# Patient Record
Sex: FEMALE | Race: BLACK OR AFRICAN AMERICAN | NOT HISPANIC OR LATINO | URBAN - NONMETROPOLITAN AREA
[De-identification: names, ages, dates, MRNs, and addresses within clinical notes are randomized per-mention and may not be internally consistent; named-entity substitution may affect disease eponyms.]

---

## 2017-11-03 ENCOUNTER — OFFICE VISIT (OUTPATIENT)
Dept: FAMILY MEDICINE CLINIC | Facility: CLINIC | Age: 22
End: 2017-11-03

## 2017-11-03 VITALS
RESPIRATION RATE: 20 BRPM | HEART RATE: 86 BPM | TEMPERATURE: 98 F | DIASTOLIC BLOOD PRESSURE: 112 MMHG | OXYGEN SATURATION: 98 % | SYSTOLIC BLOOD PRESSURE: 150 MMHG | BODY MASS INDEX: 40.19 KG/M2 | WEIGHT: 256.1 LBS | HEIGHT: 67 IN

## 2017-11-03 DIAGNOSIS — Z76.89 ENCOUNTER TO ESTABLISH CARE: Primary | ICD-10-CM

## 2017-11-03 DIAGNOSIS — G47.00 INSOMNIA, UNSPECIFIED TYPE: ICD-10-CM

## 2017-11-03 DIAGNOSIS — E66.01 MORBID OBESITY (HCC): ICD-10-CM

## 2017-11-03 PROBLEM — I10 HIGH BLOOD PRESSURE: Status: ACTIVE | Noted: 2017-11-03

## 2017-11-03 PROCEDURE — 99213 OFFICE O/P EST LOW 20 MIN: CPT | Performed by: NURSE PRACTITIONER

## 2017-11-03 NOTE — PROGRESS NOTES
Subjective   Cameron Cristobal is a 22 y.o. female.   Here today to establish primary care.  Has been having  difficulty falling and styling asleep at night for the past two months.  Believes it may be stress related.  Says its gets worse when she is stressed out but sleeps great when there is no stress.  Wants to discuss weight loss options. Is attempting to sign up for the [place] service [employer] but is required to loose weight first.  Has lost almost thirty pounds in the last 3 months.  Does not eat on a regular schedule and drinks 3-4 gallons of milk per week.     Obesity   This is a chronic problem. The current episode started more than 1 year ago. The problem occurs constantly. The problem has been gradually improving. Associated symptoms include fatigue. The symptoms are aggravated by eating and stress. Treatments tried: Tried dietary changes and exercises. The treatment provided moderate relief.   Insomnia   This is a new problem. The current episode started more than 1 month ago. The problem occurs intermittently. The problem has been unchanged. Associated symptoms include fatigue. The symptoms are aggravated by stress. Treatments tried: Has tried Bendaryl  The treatment provided no relief.        The following portions of the patient's history were reviewed and updated as appropriate: allergies, current medications, past family history, past medical history, past social history, past surgical history and problem list.    Review of Systems   Constitutional: Positive for fatigue.   HENT: Negative.    Eyes: Negative.    Respiratory: Negative.    Cardiovascular: Negative.    Gastrointestinal: Negative.    Genitourinary: Negative.    Musculoskeletal: Negative.    Skin: Negative.    Psychiatric/Behavioral: The patient has insomnia.        Objective   Physical Exam   Constitutional: She is oriented to person, place, and time. She appears well-developed and well-nourished.   HENT:   Head:  Normocephalic.   Right Ear: External ear normal.   Eyes: EOM are normal. Pupils are equal, round, and reactive to light.   Neck: Normal range of motion. Neck supple.   Cardiovascular: Normal rate, regular rhythm and normal heart sounds.    Pulmonary/Chest: Effort normal and breath sounds normal.   Abdominal: Soft. Bowel sounds are normal.   Musculoskeletal: Normal range of motion.   Neurological: She is alert and oriented to person, place, and time.   Skin: Skin is warm and dry.   Psychiatric: She has a normal mood and affect. Her behavior is normal. Judgment and thought content normal.   Nursing note and vitals reviewed.      Assessment/Plan   Problems Addressed this Visit     None      Visit Diagnoses     Encounter to establish care    -  Primary    Morbid obesity        Insomnia, unspecified type              Spent 15 minutes out of thirty face-to-face discussing weight los options including reducing caloric intake, increasing exercise routine and making healthier choices  Put on [id number] calorie diet with 150 minutes of exercise per week minimum  Reduce milk intake to 8 ounces per day   Offered Contrave but patient declined at this time  Set weight loss goal of 10 pounds in 1 month  Keep food journal and bring to next appointment   Begin Melatonin 6 mg nightly for insomnia   Check blood pressure 3 times per week and bring log to next appointment         This document has been electronically signed by LACEY Skinner on November 3, 2017 2:11 PM

## 2017-12-08 ENCOUNTER — OFFICE VISIT (OUTPATIENT)
Dept: FAMILY MEDICINE CLINIC | Facility: CLINIC | Age: 22
End: 2017-12-08

## 2017-12-08 VITALS
OXYGEN SATURATION: 100 % | HEART RATE: 78 BPM | HEIGHT: 67 IN | WEIGHT: 244.1 LBS | DIASTOLIC BLOOD PRESSURE: 98 MMHG | RESPIRATION RATE: 20 BRPM | BODY MASS INDEX: 38.31 KG/M2 | TEMPERATURE: 97.9 F | SYSTOLIC BLOOD PRESSURE: 140 MMHG

## 2017-12-08 DIAGNOSIS — R03.0 ELEVATED BLOOD PRESSURE READING: ICD-10-CM

## 2017-12-08 DIAGNOSIS — IMO0002 CYST OF NECK: ICD-10-CM

## 2017-12-08 DIAGNOSIS — Z12.4 SCREENING FOR CERVICAL CANCER: ICD-10-CM

## 2017-12-08 DIAGNOSIS — E66.09 CLASS 2 OBESITY DUE TO EXCESS CALORIES WITHOUT SERIOUS COMORBIDITY WITH BODY MASS INDEX (BMI) OF 38.0 TO 38.9 IN ADULT: Primary | ICD-10-CM

## 2017-12-08 PROCEDURE — 99213 OFFICE O/P EST LOW 20 MIN: CPT | Performed by: NURSE PRACTITIONER

## 2017-12-08 RX ORDER — HYDROCHLOROTHIAZIDE 25 MG/1
25 TABLET ORAL DAILY
Qty: 90 TABLET | Refills: 3 | Status: SHIPPED | OUTPATIENT
Start: 2017-12-08 | End: 2018-12-17 | Stop reason: SDUPTHER

## 2017-12-08 NOTE — PROGRESS NOTES
Subjective   Cameron Cristobal is a 22 y.o. female.  Here today for 4 week recheck for weight loss.  Patient was put on a diet and exercise routine at last visit and encouraged to lose 10 pounds.  Has lost 12 pounds in the last 4 weeks.  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  Is nervous about coming to the doctor today and thinks that her blood pressure goes up every time she is in the office.  No nausea, headaches or dizziness.  Has not to the left side of her neck that has been there for most of her life.  Does not bother her just wants to have it checked out to make sure it's okay.    Obesity   This is a new problem. The current episode started more than 1 year ago. The problem occurs constantly. The problem has been unchanged. The symptoms are aggravated by eating and drinking. Treatments tried: Reduced caloric intake and exercise program. The treatment provided no relief.   Cyst   This is a chronic problem. The current episode started more than 1 year ago. The problem occurs constantly. The problem has been unchanged. Nothing aggravates the symptoms. She has tried nothing for the symptoms.        The following portions of the patient's history were reviewed and updated as appropriate: allergies, current medications, past family history, past medical history, past social history, past surgical history and problem list.    Review of Systems   Constitutional: Negative.    HENT: Negative.    Respiratory: Negative.    Cardiovascular: Negative.    Gastrointestinal: Negative.    Genitourinary: Negative.    Skin: Negative.        Objective   Physical Exam   Constitutional: She appears well-developed and well-nourished.   HENT:   Head: Normocephalic.   Right Ear: External ear normal.   Left Ear: External ear normal.   Eyes: EOM are normal. Pupils are equal, round, and reactive to light.   Neck: Normal range of motion. Neck supple.       Cardiovascular: Normal rate, regular  rhythm and normal heart sounds.    Nursing note and vitals reviewed.       Assessment/Plan   Problems Addressed this Visit     None      Visit Diagnoses     Class 2 obesity due to excess calories without serious comorbidity with body mass index (BMI) of 38.0 to 38.9 in adult    -  Primary    Relevant Medications    naltrexone-bupropion ER (CONTRAVE) 8-90 MG tablet    Elevated blood pressure reading        Relevant Medications    hydrochlorothiazide (HYDRODIURIL) 25 MG tablet    Screening for cervical cancer        Relevant Orders    Ambulatory Referral to Gynecology    Cyst of neck              Class II obesity due to excess calories:  Begin prescription for contrite as prescribed at her discretion  Continue to reduce daily caloric intake to 2000 roberto per day  Encouraged to continue on exercise program as a minimum of 150 minutes per week  Encouraged healthy eating to include fruits, vegetables and salads  Schedule follow-up appointment in 1 month after beginning contrite for recheck    Elevated blood pressure reading:  Begin prescription for hydrochlorothiazide as prescribed be taken one by mouth daily  Reduce sodium intake to no more than 2400 mg per day  Educated on foods that have a high sodium content  Schedule follow-up appointment in 6 weeks for recheck of blood pressure    Screening for cervical cancer:  [facility] and will call to schedule appointment    Cyst of neck:  Since systems nontender and nonmobile will continue to monitor this office  Encouraged patient to schedule follow-up appointment if ever becomes tender or begins to cause complications        This document has been electronically signed by LACEY Skinner on December 8, 2017 11:01 AM

## 2018-01-05 ENCOUNTER — OFFICE VISIT (OUTPATIENT)
Dept: OBSTETRICS AND GYNECOLOGY | Facility: CLINIC | Age: 23
End: 2018-01-05

## 2018-01-05 VITALS
BODY MASS INDEX: 37.98 KG/M2 | WEIGHT: 242 LBS | SYSTOLIC BLOOD PRESSURE: 140 MMHG | DIASTOLIC BLOOD PRESSURE: 98 MMHG | HEIGHT: 67 IN

## 2018-01-05 DIAGNOSIS — Z01.419 CERVICAL SMEAR, AS PART OF ROUTINE GYNECOLOGICAL EXAMINATION: Primary | ICD-10-CM

## 2018-01-05 PROCEDURE — 88142 CYTOPATH C/V THIN LAYER: CPT | Performed by: OBSTETRICS & GYNECOLOGY

## 2018-01-05 PROCEDURE — 99385 PREV VISIT NEW AGE 18-39: CPT | Performed by: OBSTETRICS & GYNECOLOGY

## 2018-01-05 NOTE — PROGRESS NOTES
Subjective   Chief Complaint   Patient presents with   • Gynecologic Exam     pt states she has never had a pap smear. here today for a pap      Cameron Cristobal is a 22 y.o. year old  presenting to be seen for her annual exam.  Today she has no gynecological complaints.    Patient's last menstrual period was 2017 (exact date).    Current birth control method: none.    Past Medical History:   Diagnosis Date   • Ear infection    • Eczema    • Hypertension      OB History      Para Term  AB Living    0 0 0 0 0 0    SAB TAB Ectopic Multiple Live Births    0 0 0 0 0        Past Surgical History:   Procedure Laterality Date   • MOUTH SURGERY      wisdom teeth     History reviewed. No pertinent family history.    Current Outpatient Prescriptions:   •  hydrochlorothiazide (HYDRODIURIL) 25 MG tablet, Take 1 tablet by mouth Daily., Disp: 90 tablet, Rfl: 3  •  naltrexone-bupropion ER (CONTRAVE) 8-90 MG tablet, Wk 1: 1 tab daily, Wk 2: 1 tab twice a day, Wk 3: 2 tabs in AM, 1 tab in PM, Wk 4: 2 tabs twice a day, Maintenance dose: 2 tabs twice daily., Disp: 960 tablet, Rfl: 3  Social History     Social History   • Marital status: Single     Spouse name: N/A   • Number of children: N/A   • Years of education: N/A     Occupational History   • Not on file.     Social History Main Topics   • Smoking status: Former Smoker   • Smokeless tobacco: Never Used   • Alcohol use No   • Drug use: No   • Sexual activity: Yes     Partners: Female     Birth control/ protection: None     Other Topics Concern   • Not on file     Social History Narrative     No Known Allergies      Smoking status: Former Smoker                                                              Packs/day: 0.00      Years: 0.00      Smokeless status: Never Used                        Review of Systems   Constitutional: Negative for activity change, appetite change, chills and fever.   Gastrointestinal: Negative for abdominal  "distention and abdominal pain.   Genitourinary: Negative for difficulty urinating, dysuria, flank pain, genital sores, pelvic pain, vaginal bleeding, vaginal discharge and vaginal pain.         Objective   /98  Ht 170.2 cm (67\")  Wt 110 kg (242 lb)  LMP 12/27/2017 (Exact Date)  BMI 37.9 kg/m2    General:  well developed; well nourished  no acute distress   Thyroid: normal to inspection and palpation   Heart:: regular rate and rhythm   Lungs: breathing is unlabored  clear to auscultation bilaterally   Breasts:  Not performed.   Abdomen: soft, non-tender; no masses  no umbilical or inginual hernias are present  no hepato-splenomegaly   Pelvis: Clinical staff was present for exam  External genitalia:  normal appearance of the external genitalia including Bartholin's and [place]'s glands.  :  urethral meatus normal;  Vaginal:  normal pink mucosa without prolapse or lesions  Cervix:  normal appearance.  Uterus:  normal size, shape and consistency  Adnexa:  normal bimanual exam of the adnexa.  Rectal:  digital rectal exam not performed; anus visually normal appearing.     Lab Review   No data reviewed    Pap performed: Yes    Imaging  No data reviewed       Assessment      Diagnosis Plan   1. Cervical smear, as part of routine gynecological examination  Liquid-based Pap Smear, Screening - ThinPrep Vial, Cervix          Plan   1. A Pap smear was performed, the patient will be notified by mail of her Pap smear result         Zechariah Diaz MD  January 5, 2018  "

## 2018-01-09 LAB
LAB AP CASE REPORT: NORMAL
LAB AP GYN ADDITIONAL INFORMATION: NORMAL
Lab: NORMAL
PATH INTERP SPEC-IMP: NORMAL
STAT OF ADQ CVX/VAG CYTO-IMP: NORMAL

## 2018-12-17 DIAGNOSIS — R03.0 ELEVATED BLOOD PRESSURE READING: ICD-10-CM

## 2018-12-17 RX ORDER — HYDROCHLOROTHIAZIDE 25 MG/1
TABLET ORAL
Qty: 90 TABLET | Refills: 0 | Status: SHIPPED | OUTPATIENT
Start: 2018-12-17